# Patient Record
Sex: MALE | Race: WHITE | Employment: FULL TIME | URBAN - METROPOLITAN AREA
[De-identification: names, ages, dates, MRNs, and addresses within clinical notes are randomized per-mention and may not be internally consistent; named-entity substitution may affect disease eponyms.]

---

## 2018-10-30 RX ORDER — CITALOPRAM 20 MG/1
20 TABLET, FILM COATED ORAL DAILY
COMMUNITY

## 2018-10-30 RX ORDER — ATORVASTATIN CALCIUM 40 MG/1
40 TABLET, FILM COATED ORAL DAILY
COMMUNITY

## 2018-10-30 NOTE — PERIOP NOTES
PAT phone call completed, instructed pt to stop any supplements/Motrin/Aleve/Advil 1 week prior to surgery. OK to take meds listed with sip of water DOS. Stressed to pt necessity of having  DOS and to be home with him x 24 hours post op, verbalized understanding, questions/concerns denied.

## 2018-12-04 ENCOUNTER — ANESTHESIA EVENT (OUTPATIENT)
Dept: SURGERY | Age: 53
End: 2018-12-04
Payer: COMMERCIAL

## 2018-12-05 ENCOUNTER — ANESTHESIA (OUTPATIENT)
Dept: SURGERY | Age: 53
End: 2018-12-05
Payer: COMMERCIAL

## 2018-12-05 ENCOUNTER — APPOINTMENT (OUTPATIENT)
Dept: GENERAL RADIOLOGY | Age: 53
End: 2018-12-05
Attending: ORTHOPAEDIC SURGERY
Payer: COMMERCIAL

## 2018-12-05 ENCOUNTER — HOSPITAL ENCOUNTER (OUTPATIENT)
Age: 53
Setting detail: OUTPATIENT SURGERY
Discharge: HOME | End: 2018-12-05
Attending: ORTHOPAEDIC SURGERY | Admitting: ORTHOPAEDIC SURGERY
Payer: COMMERCIAL

## 2018-12-05 VITALS
HEART RATE: 73 BPM | RESPIRATION RATE: 16 BRPM | DIASTOLIC BLOOD PRESSURE: 95 MMHG | WEIGHT: 248.9 LBS | OXYGEN SATURATION: 96 % | TEMPERATURE: 97.5 F | BODY MASS INDEX: 30.31 KG/M2 | SYSTOLIC BLOOD PRESSURE: 131 MMHG | HEIGHT: 76 IN

## 2018-12-05 DIAGNOSIS — M19.032 ARTHRITIS OF WRIST, LEFT: Primary | ICD-10-CM

## 2018-12-05 PROCEDURE — 77030002916 HC SUT ETHLN J&J -A: Performed by: ORTHOPAEDIC SURGERY

## 2018-12-05 PROCEDURE — 64415 NJX AA&/STRD BRCH PLXS IMG: CPT

## 2018-12-05 PROCEDURE — 76210000050 HC AMBSU PH II REC 0.5 TO 1 HR: Performed by: ORTHOPAEDIC SURGERY

## 2018-12-05 PROCEDURE — C1713 ANCHOR/SCREW BN/BN,TIS/BN: HCPCS | Performed by: ORTHOPAEDIC SURGERY

## 2018-12-05 PROCEDURE — 77030006689 HC BLD OPHTH BVR BD -A: Performed by: ORTHOPAEDIC SURGERY

## 2018-12-05 PROCEDURE — 74011250636 HC RX REV CODE- 250/636: Performed by: ORTHOPAEDIC SURGERY

## 2018-12-05 PROCEDURE — 77030020782 HC GWN BAIR PAWS FLX 3M -B

## 2018-12-05 PROCEDURE — 76210000034 HC AMBSU PH I REC 0.5 TO 1 HR: Performed by: ORTHOPAEDIC SURGERY

## 2018-12-05 PROCEDURE — 74011250636 HC RX REV CODE- 250/636: Performed by: ANESTHESIOLOGY

## 2018-12-05 PROCEDURE — 74011000272 HC RX REV CODE- 272: Performed by: ORTHOPAEDIC SURGERY

## 2018-12-05 PROCEDURE — 74011250636 HC RX REV CODE- 250/636

## 2018-12-05 PROCEDURE — 77030002996 HC SUT SLK J&J -A: Performed by: ORTHOPAEDIC SURGERY

## 2018-12-05 PROCEDURE — A4565 SLINGS: HCPCS

## 2018-12-05 PROCEDURE — 77030011992 HC AIRWY NASOPHGL TELE -A: Performed by: ANESTHESIOLOGY

## 2018-12-05 PROCEDURE — 76000 FLUOROSCOPY <1 HR PHYS/QHP: CPT

## 2018-12-05 PROCEDURE — 77030018836 HC SOL IRR NACL ICUM -A: Performed by: ORTHOPAEDIC SURGERY

## 2018-12-05 PROCEDURE — 76030000003 HC AMB SURG OR TIME 1.5 TO 2: Performed by: ORTHOPAEDIC SURGERY

## 2018-12-05 PROCEDURE — 74011000250 HC RX REV CODE- 250: Performed by: ORTHOPAEDIC SURGERY

## 2018-12-05 PROCEDURE — 77030000032 HC CUF TRNQT ZIMM -B: Performed by: ORTHOPAEDIC SURGERY

## 2018-12-05 PROCEDURE — 76060000063 HC AMB SURG ANES 1.5 TO 2 HR: Performed by: ORTHOPAEDIC SURGERY

## 2018-12-05 PROCEDURE — L4398 FOOT DROP SPLINT PRE OTS: HCPCS

## 2018-12-05 PROCEDURE — 77030003601 HC NDL NRV BLK BBMI -A

## 2018-12-05 DEVICE — IMPLANTABLE DEVICE: Type: IMPLANTABLE DEVICE | Site: WRIST | Status: FUNCTIONAL

## 2018-12-05 RX ORDER — SODIUM CHLORIDE 0.9 % (FLUSH) 0.9 %
5-10 SYRINGE (ML) INJECTION AS NEEDED
Status: DISCONTINUED | OUTPATIENT
Start: 2018-12-05 | End: 2018-12-05 | Stop reason: HOSPADM

## 2018-12-05 RX ORDER — HYDROMORPHONE HYDROCHLORIDE 1 MG/ML
.25-1 INJECTION, SOLUTION INTRAMUSCULAR; INTRAVENOUS; SUBCUTANEOUS
Status: DISCONTINUED | OUTPATIENT
Start: 2018-12-05 | End: 2018-12-05 | Stop reason: HOSPADM

## 2018-12-05 RX ORDER — ROPIVACAINE HYDROCHLORIDE 5 MG/ML
INJECTION, SOLUTION EPIDURAL; INFILTRATION; PERINEURAL AS NEEDED
Status: DISCONTINUED | OUTPATIENT
Start: 2018-12-05 | End: 2018-12-05 | Stop reason: HOSPADM

## 2018-12-05 RX ORDER — LIDOCAINE HYDROCHLORIDE 10 MG/ML
0.1 INJECTION, SOLUTION EPIDURAL; INFILTRATION; INTRACAUDAL; PERINEURAL AS NEEDED
Status: DISCONTINUED | OUTPATIENT
Start: 2018-12-05 | End: 2018-12-05 | Stop reason: HOSPADM

## 2018-12-05 RX ORDER — PROPOFOL 10 MG/ML
INJECTION, EMULSION INTRAVENOUS AS NEEDED
Status: DISCONTINUED | OUTPATIENT
Start: 2018-12-05 | End: 2018-12-05 | Stop reason: HOSPADM

## 2018-12-05 RX ORDER — PROPOFOL 10 MG/ML
INJECTION, EMULSION INTRAVENOUS
Status: DISCONTINUED | OUTPATIENT
Start: 2018-12-05 | End: 2018-12-05 | Stop reason: HOSPADM

## 2018-12-05 RX ORDER — SODIUM CHLORIDE, SODIUM LACTATE, POTASSIUM CHLORIDE, CALCIUM CHLORIDE 600; 310; 30; 20 MG/100ML; MG/100ML; MG/100ML; MG/100ML
100 INJECTION, SOLUTION INTRAVENOUS CONTINUOUS
Status: DISCONTINUED | OUTPATIENT
Start: 2018-12-05 | End: 2018-12-05 | Stop reason: HOSPADM

## 2018-12-05 RX ORDER — OXYCODONE AND ACETAMINOPHEN 5; 325 MG/1; MG/1
1 TABLET ORAL
Qty: 30 TAB | Refills: 0 | Status: SHIPPED
Start: 2018-12-05 | End: 2022-02-14

## 2018-12-05 RX ORDER — FENTANYL CITRATE 50 UG/ML
INJECTION, SOLUTION INTRAMUSCULAR; INTRAVENOUS AS NEEDED
Status: DISCONTINUED | OUTPATIENT
Start: 2018-12-05 | End: 2018-12-05 | Stop reason: HOSPADM

## 2018-12-05 RX ORDER — PROMETHAZINE HYDROCHLORIDE 25 MG/1
25 TABLET ORAL
Qty: 20 TAB | Refills: 0 | Status: SHIPPED
Start: 2018-12-05 | End: 2022-02-14

## 2018-12-05 RX ORDER — SODIUM CHLORIDE 0.9 % (FLUSH) 0.9 %
5-10 SYRINGE (ML) INJECTION EVERY 8 HOURS
Status: DISCONTINUED | OUTPATIENT
Start: 2018-12-05 | End: 2018-12-05 | Stop reason: HOSPADM

## 2018-12-05 RX ORDER — CEPHALEXIN 500 MG/1
500 CAPSULE ORAL 2 TIMES DAILY
Qty: 14 CAP | Refills: 0 | Status: SHIPPED
Start: 2018-12-05 | End: 2022-02-14

## 2018-12-05 RX ORDER — DEXAMETHASONE SODIUM PHOSPHATE 4 MG/ML
INJECTION, SOLUTION INTRA-ARTICULAR; INTRALESIONAL; INTRAMUSCULAR; INTRAVENOUS; SOFT TISSUE AS NEEDED
Status: DISCONTINUED | OUTPATIENT
Start: 2018-12-05 | End: 2018-12-05 | Stop reason: HOSPADM

## 2018-12-05 RX ORDER — MIDAZOLAM HYDROCHLORIDE 1 MG/ML
INJECTION, SOLUTION INTRAMUSCULAR; INTRAVENOUS AS NEEDED
Status: DISCONTINUED | OUTPATIENT
Start: 2018-12-05 | End: 2018-12-05 | Stop reason: HOSPADM

## 2018-12-05 RX ORDER — CEFAZOLIN SODIUM/WATER 2 G/20 ML
2 SYRINGE (ML) INTRAVENOUS ONCE
Status: COMPLETED | OUTPATIENT
Start: 2018-12-05 | End: 2018-12-05

## 2018-12-05 RX ORDER — BACITRACIN ZINC 500 UNIT/G
OINTMENT (GRAM) TOPICAL AS NEEDED
Status: DISCONTINUED | OUTPATIENT
Start: 2018-12-05 | End: 2018-12-05 | Stop reason: HOSPADM

## 2018-12-05 RX ORDER — LIDOCAINE HYDROCHLORIDE 20 MG/ML
INJECTION, SOLUTION INFILTRATION; PERINEURAL AS NEEDED
Status: DISCONTINUED | OUTPATIENT
Start: 2018-12-05 | End: 2018-12-05 | Stop reason: HOSPADM

## 2018-12-05 RX ADMIN — FENTANYL CITRATE 50 MCG: 50 INJECTION, SOLUTION INTRAMUSCULAR; INTRAVENOUS at 08:02

## 2018-12-05 RX ADMIN — MIDAZOLAM HYDROCHLORIDE 3 MG: 1 INJECTION, SOLUTION INTRAMUSCULAR; INTRAVENOUS at 10:06

## 2018-12-05 RX ADMIN — MIDAZOLAM HYDROCHLORIDE 2 MG: 1 INJECTION, SOLUTION INTRAMUSCULAR; INTRAVENOUS at 10:11

## 2018-12-05 RX ADMIN — MIDAZOLAM HYDROCHLORIDE 1 MG: 1 INJECTION, SOLUTION INTRAMUSCULAR; INTRAVENOUS at 08:00

## 2018-12-05 RX ADMIN — Medication 2 G: at 10:06

## 2018-12-05 RX ADMIN — FENTANYL CITRATE 50 MCG: 50 INJECTION, SOLUTION INTRAMUSCULAR; INTRAVENOUS at 07:59

## 2018-12-05 RX ADMIN — FENTANYL CITRATE 25 MCG: 50 INJECTION, SOLUTION INTRAMUSCULAR; INTRAVENOUS at 10:31

## 2018-12-05 RX ADMIN — FENTANYL CITRATE 25 MCG: 50 INJECTION, SOLUTION INTRAMUSCULAR; INTRAVENOUS at 10:42

## 2018-12-05 RX ADMIN — FENTANYL CITRATE 25 MCG: 50 INJECTION, SOLUTION INTRAMUSCULAR; INTRAVENOUS at 10:49

## 2018-12-05 RX ADMIN — PROPOFOL 30 MG: 10 INJECTION, EMULSION INTRAVENOUS at 10:15

## 2018-12-05 RX ADMIN — ROPIVACAINE HYDROCHLORIDE 30 ML: 5 INJECTION, SOLUTION EPIDURAL; INFILTRATION; PERINEURAL at 08:07

## 2018-12-05 RX ADMIN — LIDOCAINE HYDROCHLORIDE 100 MG: 20 INJECTION, SOLUTION INFILTRATION; PERINEURAL at 10:11

## 2018-12-05 RX ADMIN — FENTANYL CITRATE 25 MCG: 50 INJECTION, SOLUTION INTRAMUSCULAR; INTRAVENOUS at 10:36

## 2018-12-05 RX ADMIN — SODIUM CHLORIDE, SODIUM LACTATE, POTASSIUM CHLORIDE, AND CALCIUM CHLORIDE 100 ML/HR: 600; 310; 30; 20 INJECTION, SOLUTION INTRAVENOUS at 07:57

## 2018-12-05 RX ADMIN — DEXAMETHASONE SODIUM PHOSPHATE 4 MG: 4 INJECTION, SOLUTION INTRA-ARTICULAR; INTRALESIONAL; INTRAMUSCULAR; INTRAVENOUS; SOFT TISSUE at 08:07

## 2018-12-05 RX ADMIN — MIDAZOLAM HYDROCHLORIDE 3 MG: 1 INJECTION, SOLUTION INTRAMUSCULAR; INTRAVENOUS at 07:59

## 2018-12-05 RX ADMIN — PROPOFOL 75 MCG/KG/MIN: 10 INJECTION, EMULSION INTRAVENOUS at 10:15

## 2018-12-05 RX ADMIN — MIDAZOLAM HYDROCHLORIDE 1 MG: 1 INJECTION, SOLUTION INTRAMUSCULAR; INTRAVENOUS at 08:01

## 2018-12-05 NOTE — ANESTHESIA PROCEDURE NOTES
Peripheral Block Start time: 12/5/2018 7:58 AM 
End time: 12/5/2018 8:07 AM 
Performed by: Livier Castle MD 
Authorized by: Livier Castle MD  
 
 
Pre-procedure: Indications: at surgeon's request and primary anesthetic Preanesthetic Checklist: patient identified, risks and benefits discussed, site marked, timeout performed, anesthesia consent given and patient being monitored Timeout Time: 07:58 Block Type:  
Block Type:  Supraclavicular Laterality:  Left Monitoring:  Continuous pulse ox, frequent vital sign checks, heart rate, responsive to questions and oxygen Injection Technique:  Single shot Procedures: ultrasound guided and nerve stimulator Patient Position: supine Prep: chlorhexidine Location:  Supraclavicular Needle Type:  Stimuplex Needle Gauge:  22 G Needle Localization:  Anatomical landmarks and ultrasound guidance Assessment: 
Number of attempts:  1 Injection Assessment:  Incremental injection every 5 mL, local visualized surrounding nerve on ultrasound, negative aspiration for blood, no paresthesia and no intravascular symptoms Patient tolerance:  Patient tolerated the procedure well with no immediate complications

## 2018-12-05 NOTE — BRIEF OP NOTE
BRIEF OPERATIVE NOTE    Date of Procedure: 12/5/2018   Preoperative Diagnosis: LEFT WRIST OSTEOARTHRITIS  Postoperative Diagnosis: LEFT WRIST OSTEOARTHRITIS    Procedure(s):  LEFT WRIST SCAPHOTRAPEZIOTRAPEZOIDAL ARTHROPLASTY WITH EXCISION OF SCAPHOID DISTAL 3RD, GRAFT INTERPOSITION AND LUNATE CYST CURETTAGE, BONE GRAFTING (LONG ACTING REGIONAL BLOCK)  Surgeon(s) and Role:     Gus Magana MD - Primary         Surgical Assistant: none    Surgical Staff:  Circ-1: Jesika Agudelo RN  Scrub Tech-1: Rito TOM  Surg Asst-1: Amber Aponte  No case tracking events are documented in the log.   Anesthesia: Regional   Estimated Blood Loss: minimal  Specimens: * No specimens in log *   Findings: severe arthritis   Complications: none  Implants: * No implants in log *

## 2018-12-05 NOTE — PROGRESS NOTES
POST ANESTHESIA CARE    DISCHARGE / TRANSFER NOTE    Jadyn Mane was   discharged     via     wheel chair     to   private vehicle    . Patient was escorted by nurse    . Patient verbalized   appreciation and was very pleased with care received    throughout their stay. Patient was discharged in     pleasant mood    . Pain at discharge/transfer was    0 /10. Discharge, medication and follow-up instructions were verbalized as understood prior to discharge  (if applicable for same-day procedures being discharged.)    All personal belongings have been returned to patient, and patient/family verbally confirm receiving belongings as all present.     Signed: Rashard SOWN RN-BC

## 2018-12-05 NOTE — DISCHARGE INSTRUCTIONS
MD Dr. Naldo Banks Dr., MD Dr. Minette Abate. Severo Angus, MD    You have undergone surgery by one of our hand specialists. Please follow these instructions to ensure a safe and speedy recovery. 1. SURGICAL DRESSING (bandage): Your bandage should be kept dry and in place until you return to the office for your follow up visit. This helps to guard against infection. [x]         You can shower if you place a plastic bag over your dressing.    []         You will need to sponge bathe until you are seen in the office. 2. ELEVATION:  It is VERY IMPORTANT that you keep your arm and hand above the level of your heart at all times, awake or asleep. The higher you elevate your hand, the less it will swell, and the less it will hurt. It is best to elevate the hand as shown below:              Laying down, especially at night,  with your arm elevated on pillows help keep your shoulder and elbow from getting stiff. 3. Ice bags / ice packs can be used to help control pain. If you make your own ice bag, double-bagging helps to prevent leaks. 4. MEDICATIONS:  You have been given a prescription for pain medications. Take it according to the instructions on the bottle. DO NOT drink alcohol while taking pain medications. DO NOT drive, operate heavy machinery, or make important personal or business decisions since the medications will make you drowsy. We do NOT refill prescriptions over the weekend. Please arrange to call for prescription refills during regular office hours. 5. APPOINTMENT:  Your post operative appointment has been made for the following time:    TIME:1:20pm             DATE:12/18/18         At the:   []  [facility] Office       [x]  [address] [place] [address] Office with Dr Rocael Gan    6. AFTER GENERAL ANESTHESIA or IV SEDATION:   DO NOT drink alcohol or drive, work around [place] [address], or make important personal or business decisions. Limit your activity for 24 hours. Resume your diet with light foods (Jell-o ®, clear soups, clear fluids, caffeine-free drinks). If you do not have any nausea, you may resume your regular diet. We at [address] [place] [address] want your surgery and recovery to be as comfortable and successful as possible. Should you have problems, please call our office during the morning hours. In particular, call if your pain is not adequately controlled by prescribed medication, temperature is over 101 degrees, or your bandage is wet or has a four odor. Your doctor contact information:   [address] [place] [address] [place] [phone]  Kaia Orozcopiter, ext [phone] [facility] END OFFICE - [address], [address]  [place], [address] from Your Nurse    PATIENT INSTRUCTIONS:    AFTER ANESTHESIA & SEDATION, and WHILE TAKING PAIN MEDICINE  After general anesthesia / intravenous sedation and the 24 hours following, and/or while taking prescription Opiates:  · Limit your activities  · Do not drive and operate hazardous machinery until you have been of all narcotics and sedatives for over 24 hours  · Do not make important personal or business decisions  · Do  not drink alcoholic beverages  · If you have not urinated within 8 hours after discharge, please contact your surgeon on call. SIGNS OF INFECTION  Report the following Signs of Infection or General Problems after surgery to your surgeon:  · Excessive pain, swelling, redness or odor of or around the surgical area  · Temperature over 101; Temperature over 100 if on medications that affect your ability to fight infections  · Nausea and vomiting lasting longer than 4 hours or if unable to take medications  · Any signs of decreased circulation or nerve impairment to extremity: change in color, persistent  numbness, tingling, coldness or increase pain  · If you have any questions.       COUGH AND DEEP BREATHE  Breathing deep and coughing are very important exercises to do after surgery. Deep breathing and coughing open the little air tubes and air sacks in your lungs. You take deep breaths every day. You may not even notice - it is just something you do when you sigh or yawn. It is a natural exercise you do to keep these air passages open. After surgery, take deep breaths and cough, on purpose. Coughing and deep breathing help prevent bronchitis and pneumonia after surgery. If you had chest or belly surgery, use a pillow as a \"hug joaquim\" and hold it tightly to your chest or belly when you cough. DIRECTIONS:  1. Take 10 to 15 slow deep breaths every hour while awake. 2. Breathe in deeply, and hold it for 2 seconds. 3. Exhale slowly through puckered lips, like blowing up a balloon. 4. After every 4th or 5th deep breath, hug your pillow to your chest or belly and give a hard, deep cough. Yes, it will probably hurt. But doing this exercise is very important part of healing after surgery. Take your pain medicine to help you do this exercise without too much pain. ANKLE PUMPS    Ankle pumps increase the circulation of oxygenated blood to your lower extremities and decrease your risk for circulation problems such as blood clots. They also stretch the muscles, tendons and ligaments in your foot and ankle, and prevent joint contracture in the ankle and foot, especially after surgeries on the legs. It is important to do ankle pump exercises regularly after surgery because immobility increases your risk for developing a blood clot. Your doctor may also have you take an Aspirin for the next few days as well. If your doctor did not ask you to take an Aspirin, consult with him before starting Aspirin therapy on your own. The exercise is quite simple.      · Slowly point your foot forward, feeling the muscles on the top of your lower leg stretch, and hold this position for 5 seconds. · Next, pull your foot back toward you as far as possible, stretching the calf muscles, and hold that position for 5 seconds. · Repeat with the other foot. · Perform 10 repetitions every hour while awake for both ankles if possible (down and then up with the foot once is one repetition). You should feel gentle stretching of the muscles in your lower leg when doing this exercise. If you feel pain, or your range of motion is limited, don't push too hard. Only go the limit your joint and muscles will let you go. If you have increasing pain, progressively worsening leg warmth or swelling, STOP the exercise and call your doctor. Other Wound Care information:  [] No additional recommendations. P.R.I.C.E. INSTRUCTIONS    PRICE is an acronym that stands for Protect, Rest, Ice, Compression, and Elevation (sometimes you might see the acronym RICE.)   Listed below are five activities one can do for an injured limb or soft tissue injury. While much anecdotal understanding learned through many years of experience supports these seemingly common sense treatments, building scientific evidence is showing how and why these treatment principles are proving to be so beneficial.  Below is a breakdown of what the PRICE principles entail to speed healing along. PROTECT may sound like an obvious thing to do, and really, it is common sense. After an injury or surgery, protecting the site that hurts help to prevent further injury. REST is essential for an injured limb. Like protection, the more you are up on an injured limb, especially in the early stages of an injury, the more damage you can do. Rest means no activities that would involve the use of the injured tissues so that the early stages of healing can begin without  interruption. ICE \"is perhaps the simplest and oldest [therapy] in the treatment of soft tissue injuries. \"4    Ice help decrease swelling in inflamed and damaged tissues, can diminish the feeling of pain and decrease muscle spasms, and, immediately after an injury,  can slow cellular metabolism and help to prevent further tissue injury from oxygen starvation caused by the swelling. 5      COMPRESSION help decrease pain by limiting movement of an injured limb. Compression can be found through the use of an elastic wrap bandage, a cast, splint, or simply a snug cooling cuff or an ice pack and pillow. ELEVATION is a very important intervention. Placing the injury above the level of the heart whenever possible helps decrease swelling by using gravity to one's advantage . Placing the injury above the level of the heart also helps prevent, or at least decrease, the throbbing pain that is sometimes experienced after surgery or injury. Sources:  6. Muscle injuries: optimizing recovery (2007) Best Practice & Research Clinical Rheumatology Vol. 21, No. 2, pp 223-553, Accessed 9/26/11    7. PRICE first aid guidelines - Protection, Rest, Ice, Compression and Elevation By Ronita Lines, About. com Guide, Updated March 27, 2011, Accessed 9/26/11 http://sportsmedicine. [employer]/cs/rehab/a/rice. htm    8. Rest Ice Compression Elevation: RICE for injuries, Accessed 9/26/11 LipLotion.ch. com/rest-ice-compression-elevation. html    9. The use of ice in the treatment of acute soft-tissue injury (2004) Jhonny, Vol. 32, No. 1, pp 251-261, Accessed 9/26/11 http://ajs.Qwilt.Iper[employer]/content/32/1/251.full.pdf+html    10. Soft tissue damage and healing; theory and techniques, www.iaaf.org, Ch. 9 of  medical page, by tessa Wei And María Elena Wallace 9/27/11 FormerIdols.gl. pdf              Going Home After A  Peripheral Nerve Block    Patient Information    The anesthesiology team has provided for your pain control through a technique called regional anesthesia.   As the name implies, anesthesia (decreased or no pain, sensation, or motor control) has been provided to a specific region, whether that be an arm or a leg. How does this happen?  you might ask. While you were sleepy, the anesthesia provider provided medicine to a specific group of nerves either in the neck/shoulder region or in the groin and/or buttock region, similar to the way a dentist might numb a tooth (teeth.)  They typically use an ultrasound machine to know where the medicine is placed in relation to the nerves they wish to numb up or block.   What this means is that for the next few hours, you should expect to have a numb limb. Below are some things we wish for you to read and be familiar with concerning your anesthetized limb. Caring For a Blocked Body Part    General Considerations:   The numbness may last up to 24 hours   You must protect your arm or leg. The blocked extremity is numb, weak, and difficult for your brain to locate and communicate with. To do this you should:  o Pay attention to the position of the blocked limb at all times. o Be very careful when placing hot or cod items on a numb limb. You could cause tissue damage like burns or frostbite if you are unable to feel temperature. Carefully follow your discharge instructions regarding the use of these therapies in you post-operative care. o Carefully pad the affected limb. Normally we continually move and adjust the position of our bodies without thinking about it. This movement and continuous repositioning helps to prevent injuries from immobility. When a limb is numb it still requires this care  o Be extremely careful not to bump or hit the numb body part. This can result in an unrecognized injury, at lease until the blocked limb wakes up. It can also result in worse pain of your already post-surgical limb.     Arm/Shoulder Blocks:   You may experience a droopy eyelid, nasal stuffiness, and redness of the eye after receiving an arm/shoulder block. This is called Bhargavis Syndrome, and is very common. There is no need for concern. You may also experience mild hoarseness, but all of these symptoms should resolve within 24 hours.  Some patients may experience mild shortness of breath. A sitting position will help alleviate this and it should resolve within 24 hours. If you experience significant or progressive worsening of the shortness of breath, seek medical attention immediately. Knee/Foot Blocks:   DO NOT use the blocked leg to walk, balance or support yourself. Your leg will not be able to balance your weight properly while any part of your leg is numb. You are at a RISK for [address].  Be careful not to drag your foot along the ground or stub your toes. Contact Phone Numbers    CALL 911 IN CASE OF AN EMERGENCY. For all other non-emergency inquiries call the [facility]  at [phone] and ask for the anesthesiologist on call to be paged. Below is information on the medication(s) your doctor is prescribing for you: The maximum daily dose of acetaminophen was discussed with the patient. He was encouraged not to exceed 3,000 mg of acetaminophen during a 24 hour period and was asked to keep in mind that acetaminophen can also be found in many over-the-counter cold medications as well as narcotics that may be given for pain. The patient expresses understanding of these issues and questions were answered. 4 THINGS ABOUT PAIN MEDICINE I ALWAYS TALK ABOUT:  There are 4 side effects I always talk about for pain medications. 1. They make you sleepy and drowsy. Do not drive a car or operate machines while taking pain medication. Do not make any major decisions. Take a nap. Relax. Let your body recover from the affects of anesthesia and surgery. 2. Some people have quite a problem with itching and. ..  3. Nausea or vomiting.   I mention these together because research tends to suggest there is a gene-related issue. While some have a hard time with these problems, others do not. These are expected and know side effects. Over-the-counter Benadryl® (the drug name is diphenhydramine) can help with the itching. Your doctor may also have given you some medicine for nausea. IF HE DID NOT, CALL HIM/HER. 4. Last but not least is the problem of constipation (not meena able to have a bowel movement - poop.)  All pain medicine can slow down the movement of food through the gut. The slower it goes, the worse it can be. Frankly, this means hard poop adding insult to the injury of surgery. And if you had tummy surgery, like having your gall bladder removed or a hernia repair, YOU DO NOT WANT THIS PROBLEM. There are 4 things I recommend. · Drink lots of fluids. For healthy people with no heart problems, this means at least 64 ounces of liquids or more per day. For example, a Big Gulp® from [employer] is 32 ounces. So you need to drink at least 2  Big Gulp®'s of fluids every day. If you have heart problems you may not be able to do this. Talk to your doctor about what you should do to prevent constipation. · Drinking fruit juice like apple, pear, or prune juice gives you extra \"BANG\" for your beverage. These drinks are high in natural fiber. If you are a diabetic, drink sugar-free fluids with fiber additives (see next 2 points.)  Avoid drinking extra fruit juice unless this is a regular part of your diet plan. · Eat extra fresh fruits and vegetables. · Add extra fiber-products. Fiber products like Metamucil®, Citrucel®, Miralax® or Benefiber® can help. These products are over-the-counter and you do not need a prescription from your doctor. If you have followed these recommendations and still have some difficulty having a good poop, take and over-the-counter stimulant like Dulcolax® (biscodyl)  or Senokot® (senna concentrate).   These may help get things moving. Jomar Choi MEDICATION AND   SIDE EFFECT GUIDE    The [facility] MEDICATION AND SIDE EFFECT GUIDE was provided to the PATIENT AND CARE PROVIDER. Information provided includes instruction about drug purpose and common side effects for the following medications:   · OXCYCODONE/ACETAMINOPHEN - for Pain  · Chela Flakes - antibiotic  · PHENERGAN - if needed for nausea      Due to patients Obstructive Sleep Apnea and current use of a CPAP machine, continuous use of CPAP machine while taking pain medications is advised and instructed. Medication information added to discharge record on December 5, 2018 at 11:21 AM.      Some information we wish all of our patients to be familiar with and General Information for Healthy Lifestyle choices:    · Make a list of your current medications with your Primary Care Provider. · Update this list whenever your medications are discontinued, doses are changed, or new medications (including over-the-counter products like ibuprofen, vitamins, or herbal remedies) are added. · Carry medication information at all times in case of emergency situations      No smoking / No tobacco products / Avoid exposure to second hand smoke    Surgeon General's Warning:  Quitting smoking now greatly reduces serious risk to your health. Obesity, smoking, and sedentary lifestyle greatly increases your risk for illness. A healthy diet, regular physical exercise & weight monitoring are important for maintaining a healthy lifestyle. A Note About Congestive Heart Failure: You may be retaining fluid if you have a history of heart failure or if you experience any of the following symptoms:      · Weight gain of 3 pounds or more overnight or 5 pounds in a week  · Increased swelling in our hands or feet  · Shortness of breath while lying flat in bed      Please call your doctor as soon as you notice any of these symptoms; do not wait until your next office visit.     A Note About Strokes:  Recognize signs and symptoms of STROKE. The simple mnemonic, F.A.S.T., can help you remember signs of a stroke and what to do if you suspect a stroke is occuring to you or someone you are with:    F - Face looks uneven  A - Arms unable to move, or move evenly  S - Speech is slurred or non-existent  T - Time - CALL 911 as soon as signs and symptoms begin - DO NOT go to bed or wait to see if you get better - TIME IS BRAIN. Warning Signs of HEART ATTACK   Call 911 if you have these symptoms:     Chest discomfort. Most heart attacks involve discomfort in the center of the chest that lasts more than a few minutes, or that goes away and comes back. It can feel like uncomfortable pressure, squeezing, fullness, or pain.  Discomfort in other areas of the upper body. Symptoms can include pain or discomfort in one or both arms, the back, neck, jaw, or stomach.  Shortness of breath with or without chest discomfort.  Other signs may include breaking out in a cold sweat, nausea, or lightheadedness. Don't wait more than five minutes to call 911 - MINUTES MATTER! Fast action can save your life. Calling 911 is almost always the fastest way to get lifesaving treatment. Emergency Medical Services staff can begin treatment when they arrive -- up to an hour sooner than if someone gets to the hospital by car. AT THE COMPLETION OF DISCHARGE INSTRUCTION REVIEW, WE VERIFY:  The discharge information has been reviewed with the patient and caregiver. Questions have been asked and answered meeting patient and caregiver expectations. The patient and caregiver verbalized understanding.     Your discharge nurse was Kristin FRANZ, RN-BC       Board Certified - Pain Management      Other information found in your discharge envelope:  [x]     PRESCRIPTIONS     [] Sent electronically to your pharmacy  []     PHYSICAL THERAPY PRESCRIPTION  []     APPOINTMENT CARDS  [x]     Regional Anesthesia Pamphlet for block or block with On-Q Catheter from Anesthesia  Service  []     Medical device information sheets/pamphlets from their    []     School/work excuse note. []     /parent work excuse note. The following personal items collected during your admission for safe keeping are returned to you:     Dental Appliance: Dental Appliances: None  Vi shaquille: Visual Aid: None  Hearing Aid:    Jewelry: Jewelry: None  Clothing: Clothing: (street clothes to preop [facility])  Other Valuables:  Other Valuables: None  Valuables sent to safe:

## 2018-12-05 NOTE — ANESTHESIA POSTPROCEDURE EVALUATION
Procedure(s): LEFT WRIST SCAPHOTRAPEZIOTRAPEZOIDAL ARTHROPLASTY WITH EXCISION OF SCAPHOID DISTAL 3RD, GRAFT INTERPOSITION AND LUNATE CYST CURETTAGE, BONE GRAFTING (LONG ACTING REGIONAL BLOCK). Anesthesia Post Evaluation Patient location during evaluation: bedside Level of consciousness: awake Pain management: satisfactory to patient Airway patency: patent Anesthetic complications: no 
Cardiovascular status: acceptable Respiratory status: acceptable Hydration status: acceptable Visit Vitals /51 Pulse 73 Temp 36.4 °C (97.5 °F) Resp 21 Ht 6' 4\" (1.93 m) Wt 112.9 kg (248 lb 14.4 oz) SpO2 96% BMI 30.30 kg/m²

## 2018-12-05 NOTE — ANESTHESIA PREPROCEDURE EVALUATION
Anesthetic History No history of anesthetic complications Review of Systems / Medical History Patient summary reviewed, nursing notes reviewed and pertinent labs reviewed Pulmonary Within defined limits Sleep apnea Neuro/Psych Within defined limits Psychiatric history Cardiovascular Within defined limits GI/Hepatic/Renal 
Within defined limits Endo/Other Within defined limits Other Findings Physical Exam 
 
Airway Mallampati: II 
TM Distance: 4 - 6 cm Neck ROM: normal range of motion Mouth opening: Normal 
 
 Cardiovascular Rhythm: regular Rate: normal 
 
 
 
 Dental 
No notable dental hx Pulmonary Breath sounds clear to auscultation Abdominal 
 
 
 
 Other Findings Anesthetic Plan ASA: 2 Anesthesia type: regional - supraclavicular block Anesthetic plan and risks discussed with: Patient

## 2018-12-05 NOTE — PERIOP NOTES
PACU IN REPORT FROM ANESTHESIA    Verbal report received from   [address]   following Regional for Procedure(s) (LRB):  LEFT WRIST SCAPHOTRAPEZIOTRAPEZOIDAL ARTHROPLASTY WITH EXCISION OF SCAPHOID DISTAL 3RD, GRAFT INTERPOSITION AND LUNATE CYST CURETTAGE, BONE GRAFTING (LONG ACTING REGIONAL BLOCK) (Left) by  Tico Mata MD.    Note the anesthesia record for medications given intraoperatively. Brief Initial Visual Assessment:    Patient Age: [] Infant(1-12mo)      []Pediatric(1-13yrs)    [] Adolescent(13-18yrs)    [x] Adult(18-65yrs)      []Geriatric Adult(>65yrs). Patient    [] Alert           []Calm & Cooperative      [] Anxious  Appearance: [] Drowsy      [x] Sedated                          [x] Unresponsive     Oriented x 0           Airway:     [] Patent                          [x] Near-obstructed   [] Obstructed                        [x]Improved with head/airway repositioning                       Airway Adjuncts Present: [] Oral Airway    [] Nasal Trumpet         [] ETT     Respiratory  [x] Even              [] Labored      [] Shallow  Pattern:    [x] Non-Labored  [] VENT and/or respiratory assistance     being provided. Skin:     [x] Pink [] Pale       [x] Warm [] Cool [] Cold   [x]Dry [] Moist [] Diaphoretic     Membranes:  [x] Pink [] Pale       [x] Moist [] Dry [] Crusty     Pain:   [x] No Acute Discomfort. 0 /10 Scale [] Verbal Numeric   [] Moderate Discomfort.      [] V.A.S. [] Acute Discomfort. [x] A.N.V.    [] Chronic-Issue Related Discomfort.   [] F.L.A.C.C. Note E-MAR for medications administered. []Faces, Gonzalez/Baker    Note assessments documented in flowsheets; any assessment variants to be found in comments or narrative perioperative nurse notes.        Post-anesthesia care now assumed by [facility] BSN, RN-BC

## 2022-02-14 ENCOUNTER — HOSPITAL ENCOUNTER (EMERGENCY)
Age: 57
Discharge: HOME | End: 2022-02-14
Attending: EMERGENCY MEDICINE
Payer: COMMERCIAL

## 2022-02-14 ENCOUNTER — APPOINTMENT (OUTPATIENT)
Dept: GENERAL RADIOLOGY | Age: 57
End: 2022-02-14
Attending: EMERGENCY MEDICINE
Payer: COMMERCIAL

## 2022-02-14 ENCOUNTER — APPOINTMENT (OUTPATIENT)
Dept: CT IMAGING | Age: 57
End: 2022-02-14
Attending: EMERGENCY MEDICINE
Payer: COMMERCIAL

## 2022-02-14 VITALS
TEMPERATURE: 97.7 F | HEIGHT: 76 IN | RESPIRATION RATE: 21 BRPM | SYSTOLIC BLOOD PRESSURE: 132 MMHG | OXYGEN SATURATION: 95 % | HEART RATE: 63 BPM | BODY MASS INDEX: 31.3 KG/M2 | WEIGHT: 257.06 LBS | DIASTOLIC BLOOD PRESSURE: 87 MMHG

## 2022-02-14 DIAGNOSIS — R07.9 CHEST PAIN, UNSPECIFIED TYPE: Primary | ICD-10-CM

## 2022-02-14 LAB
ALBUMIN SERPL-MCNC: 4.1 G/DL (ref 3.5–5)
ALBUMIN/GLOB SERPL: 1.2 {RATIO} (ref 1.1–2.2)
ALP SERPL-CCNC: 71 U/L (ref 45–117)
ALT SERPL-CCNC: 31 U/L (ref 12–78)
ANION GAP SERPL CALC-SCNC: 12 MMOL/L (ref 5–15)
AST SERPL-CCNC: 41 U/L (ref 15–37)
ATRIAL RATE: 70 BPM
BASOPHILS # BLD: 0.1 K/UL (ref 0–0.1)
BASOPHILS NFR BLD: 1 % (ref 0–1)
BILIRUB SERPL-MCNC: 1.3 MG/DL (ref 0.2–1)
BNP SERPL-MCNC: 18 PG/ML (ref 0–125)
BUN SERPL-MCNC: 16 MG/DL (ref 6–20)
BUN/CREAT SERPL: 17 (ref 12–20)
CALCIUM SERPL-MCNC: 9.2 MG/DL (ref 8.5–10.1)
CALCULATED P AXIS, ECG09: 47 DEGREES
CALCULATED R AXIS, ECG10: 45 DEGREES
CALCULATED T AXIS, ECG11: 40 DEGREES
CHLORIDE SERPL-SCNC: 102 MMOL/L (ref 97–108)
CO2 SERPL-SCNC: 23 MMOL/L (ref 21–32)
CREAT SERPL-MCNC: 0.92 MG/DL (ref 0.7–1.3)
DIAGNOSIS, 93000: NORMAL
DIFFERENTIAL METHOD BLD: NORMAL
EOSINOPHIL # BLD: 0.2 K/UL (ref 0–0.4)
EOSINOPHIL NFR BLD: 3 % (ref 0–7)
ERYTHROCYTE [DISTWIDTH] IN BLOOD BY AUTOMATED COUNT: 12.3 % (ref 11.5–14.5)
GLOBULIN SER CALC-MCNC: 3.3 G/DL (ref 2–4)
GLUCOSE SERPL-MCNC: 127 MG/DL (ref 65–100)
HCT VFR BLD AUTO: 43.3 % (ref 36.6–50.3)
HGB BLD-MCNC: 15 G/DL (ref 12.1–17)
IMM GRANULOCYTES # BLD AUTO: 0 K/UL (ref 0–0.04)
IMM GRANULOCYTES NFR BLD AUTO: 0 % (ref 0–0.5)
LIPASE SERPL-CCNC: 66 U/L (ref 73–393)
LYMPHOCYTES # BLD: 1 K/UL (ref 0.8–3.5)
LYMPHOCYTES NFR BLD: 14 % (ref 12–49)
MCH RBC QN AUTO: 31.6 PG (ref 26–34)
MCHC RBC AUTO-ENTMCNC: 34.6 G/DL (ref 30–36.5)
MCV RBC AUTO: 91.2 FL (ref 80–99)
MONOCYTES # BLD: 0.7 K/UL (ref 0–1)
MONOCYTES NFR BLD: 10 % (ref 5–13)
NEUTS SEG # BLD: 5.1 K/UL (ref 1.8–8)
NEUTS SEG NFR BLD: 72 % (ref 32–75)
NRBC # BLD: 0 K/UL (ref 0–0.01)
NRBC BLD-RTO: 0 PER 100 WBC
P-R INTERVAL, ECG05: 202 MS
PLATELET # BLD AUTO: 166 K/UL (ref 150–400)
POTASSIUM SERPL-SCNC: 4.3 MMOL/L (ref 3.5–5.1)
PROT SERPL-MCNC: 7.4 G/DL (ref 6.4–8.2)
Q-T INTERVAL, ECG07: 394 MS
QRS DURATION, ECG06: 90 MS
QTC CALCULATION (BEZET), ECG08: 425 MS
RBC # BLD AUTO: 4.75 M/UL (ref 4.1–5.7)
RBC MORPH BLD: NORMAL
SODIUM SERPL-SCNC: 137 MMOL/L (ref 136–145)
TROPONIN-HIGH SENSITIVITY: 7 NG/L (ref 0–76)
TROPONIN-HIGH SENSITIVITY: 7 NG/L (ref 0–76)
VENTRICULAR RATE, ECG03: 70 BPM
WBC # BLD AUTO: 7.1 K/UL (ref 4.1–11.1)

## 2022-02-14 PROCEDURE — 80053 COMPREHEN METABOLIC PANEL: CPT

## 2022-02-14 PROCEDURE — 85025 COMPLETE CBC W/AUTO DIFF WBC: CPT

## 2022-02-14 PROCEDURE — 84484 ASSAY OF TROPONIN QUANT: CPT

## 2022-02-14 PROCEDURE — 83690 ASSAY OF LIPASE: CPT

## 2022-02-14 PROCEDURE — 74011250636 HC RX REV CODE- 250/636: Performed by: EMERGENCY MEDICINE

## 2022-02-14 PROCEDURE — 74011000636 HC RX REV CODE- 636: Performed by: EMERGENCY MEDICINE

## 2022-02-14 PROCEDURE — 93005 ELECTROCARDIOGRAM TRACING: CPT

## 2022-02-14 PROCEDURE — 83880 ASSAY OF NATRIURETIC PEPTIDE: CPT

## 2022-02-14 PROCEDURE — 99285 EMERGENCY DEPT VISIT HI MDM: CPT

## 2022-02-14 PROCEDURE — 71275 CT ANGIOGRAPHY CHEST: CPT

## 2022-02-14 PROCEDURE — 96374 THER/PROPH/DIAG INJ IV PUSH: CPT

## 2022-02-14 PROCEDURE — 36415 COLL VENOUS BLD VENIPUNCTURE: CPT

## 2022-02-14 RX ORDER — MORPHINE SULFATE 4 MG/ML
4 INJECTION INTRAVENOUS
Status: COMPLETED | OUTPATIENT
Start: 2022-02-14 | End: 2022-02-14

## 2022-02-14 RX ADMIN — IOPAMIDOL 100 ML: 755 INJECTION, SOLUTION INTRAVENOUS at 15:27

## 2022-02-14 RX ADMIN — MORPHINE SULFATE 4 MG: 4 INJECTION INTRAVENOUS at 14:14

## 2022-02-14 NOTE — DISCHARGE INSTRUCTIONS
Please call your primary care physician to schedule follow-up appointment as soon as possible. Return to the emergency department if your symptoms are worsening, if you are very short of breath, become very sweaty, passout, or develop any other symptoms you find concerning.

## 2022-02-14 NOTE — ED PROVIDER NOTES
HPI   53-year-old man with a past medical history of hyperlipidemia presents to the emergency department due to chest pressure. His symptoms started this morning after he got to work. Patient also states that the pain radiates in between his shoulder blades. It has been associated with shortness of breath on exertion. He denies any diaphoresis, nausea, or vomiting. He says this is never happened to him in the past.  He does not smoke. He has a family history of coronary artery disease in his grandmother. He denies cough. He denies any trauma. Past Medical History:   Diagnosis Date    Depression     High cholesterol     SOFÍA on CPAP        Past Surgical History:   Procedure Laterality Date    HX CARPAL TUNNEL RELEASE Left 2012    HX KNEE ARTHROSCOPY Left 2018    HX KNEE ARTHROSCOPY Right 1997    HX LAP CHOLECYSTECTOMY  2014    HX OTHER SURGICAL Right 2013    \"fusion in my right hand\"         History reviewed. No pertinent family history. Social History     Socioeconomic History    Marital status:      Spouse name: Not on file    Number of children: Not on file    Years of education: Not on file    Highest education level: Not on file   Occupational History    Not on file   Tobacco Use    Smoking status: Never Smoker    Smokeless tobacco: Never Used   Substance and Sexual Activity    Alcohol use: No    Drug use: No    Sexual activity: Not on file   Other Topics Concern    Not on file   Social History Narrative    Not on file     Social Determinants of Health     Financial Resource Strain:     Difficulty of Paying Living Expenses: Not on file   Food Insecurity:     Worried About Running Out of Food in the Last Year: Not on file    Fauzia of Food in the Last Year: Not on file   Transportation Needs:     Lack of Transportation (Medical): Not on file    Lack of Transportation (Non-Medical):  Not on file   Physical Activity:     Days of Exercise per Week: Not on file    Minutes of Exercise per Session: Not on file   Stress:     Feeling of Stress : Not on file   Social Connections:     Frequency of Communication with Friends and Family: Not on file    Frequency of Social Gatherings with Friends and Family: Not on file    Attends [employer] Services: Not on file    Active Member of Clubs or Organizations: Not on file    Attends Club or Organization Meetings: Not on file    Marital Status: Not on file   Intimate Partner Violence:     Fear of Current or Ex-Partner: Not on file    Emotionally Abused: Not on file    Physically Abused: Not on file    Sexually Abused: Not on file   Housing Stability:     Unable to Pay for Housing in the Last Year: Not on file    Number of [place] in the Last Year: Not on file    Unstable Housing in the Last Year: Not on file         ALLERGIES: Patient has no known allergies. Review of Systems   A complete review of systems was performed and all systems reviewed were negative unless otherwise documented in the HPI. Vitals:    02/14/22 1352 02/14/22 1402   BP: (!) 144/104 (!) 128/93   Pulse: 73 65   Resp: 18 21   Temp: 97.7 °F (36.5 °C)    SpO2: 95% 94%   Weight: 116.6 kg (257 lb 0.9 oz)    Height: 6' 4\" (1.93 m)             Physical Exam  Constitutional:       Comments: Patient sitting up straight in the holding bed rails but does not appear acutely distressed or diaphoretic   Eyes:      Comments: No scleral icterus. Extraocular movements intact. Neck:      Vascular: No JVD. Comments: Trachea midline  Cardiovascular:      Comments: Regular rate and rhythm. No murmurs or rubs. 2+ radial pulses bilaterally  Pulmonary:      Comments: Lungs are clear. No respiratory distress noted. No wheezing or rales  Abdominal:      Comments: Protuberant abdomen, but soft and nontender. Normal bowel sounds   Musculoskeletal:         General: Normal range of motion. Right lower leg: No edema. Left lower leg: No edema.    Skin: General: Skin is warm and dry. Neurological:      Comments: Awake and alert. Speech is normal.  GCS 15. MDM   61-year-old man who presents with the above chief complaint. Vitals are stable. Patient appears somewhat apprehensive and is sitting straight up and holding the bed rails. He is not diaphoretic. Otherwise he does not appear acutely distressed. His examination is essentially unremarkable. EKG shows normal sinus rhythm with a rate of 70 and QTC of 425 with no concerning ST elevations or depressions. No arrhythmias noted. In addition to a cardiac work-up, CT of the chest will be ordered to rule out aortic dissection. Initial labs are reassuring. High-sensitivity troponin was 7 so repeat will be ordered. CT of the chest shows no evidence of aortic dissection or any other acute abnormalities. Great vessels were also evaluated per radiology's read and there was no evidence of any significant abnormalities like a PE. On reassessment patient symptoms had significantly improved after morphine and he was only experiencing some pain when he moved his back and trunk. Repeat troponin remained stable at 7. Given his reassuring work-up patient deemed safe for discharge. Encourage the patient return to the emergency department if his symptoms recur. Patient discharged in stable condition.     Procedures

## 2022-02-14 NOTE — ED TRIAGE NOTES
Triage: pt c/o sternal chest pain radiating straight through to his back, reproducible with movement. Chest pain started this AM when he woke up. Denies injury or fall. +nausea, dizziness and SOB.

## 2023-05-23 RX ORDER — ATORVASTATIN CALCIUM 40 MG/1
40 TABLET, FILM COATED ORAL DAILY
COMMUNITY

## 2023-05-23 RX ORDER — CITALOPRAM 20 MG/1
20 TABLET ORAL DAILY
COMMUNITY

## (undated) DEVICE — DRESSING GZ FLUF 36X36 IN RND 2 PLY PD GZ AMER WHT CROSS

## (undated) DEVICE — SUTURE ETHLN SZ 4-0 L18IN NONABSORBABLE BLK L19MM PS-2 3/8 1667H

## (undated) DEVICE — HOOK LOCK LATEX FREE ELASTIC BANDAGE 3INX5YD

## (undated) DEVICE — BLADE OPHTH 180DEG CUT SURF BLU STR SHRP DBL BVL GRINDLESS

## (undated) DEVICE — SUT SLK 3-0 30IN SH BLK --

## (undated) DEVICE — MASTISOL ADHESIVE LIQ 2/3ML

## (undated) DEVICE — HAND I-LF: Brand: MEDLINE INDUSTRIES, INC.

## (undated) DEVICE — PADDING CST CRMPD 3INX4YD NS --

## (undated) DEVICE — INFECTION CONTROL KIT SYS

## (undated) DEVICE — 1010 S-DRAPE TOWEL DRAPE 10/BX: Brand: STERI-DRAPE™

## (undated) DEVICE — SUPER SPONGES,MEDIUM: Brand: DERMACEA

## (undated) DEVICE — DEVON™ KNEE AND BODY STRAP 60" X 3" (1.5 M X 7.6 CM): Brand: DEVON

## (undated) DEVICE — KERLIX BANDAGE ROLL: Brand: KERLIX

## (undated) DEVICE — LOOP VES MAXI RED SIL DISP

## (undated) DEVICE — LIGHT HANDLE: Brand: DEVON

## (undated) DEVICE — SKIN PREP TRAY W/CHG: Brand: MEDLINE INDUSTRIES, INC.

## (undated) DEVICE — STERILE POLYISOPRENE POWDER-FREE SURGICAL GLOVES: Brand: PROTEXIS

## (undated) DEVICE — BIPOLAR FORCEPS CORD: Brand: VALLEYLAB

## (undated) DEVICE — SOL IRRIGATION INJ NACL 0.9% 500ML BTL

## (undated) DEVICE — ZIMMER® STERILE DISPOSABLE TOURNIQUET CUFF WITH PROTECTIVE SLEEVE AND PLC, DUAL PORT, SINGLE BLADDER, 18 IN. (46 CM)

## (undated) DEVICE — DRAPE C ARM W54XL84IN MINI FOR OEC 6800

## (undated) DEVICE — 3M™ STERI-STRIP™ REINFORCED ADHESIVE SKIN CLOSURES, R1542, 1/4 IN X 1-1/2 IN (6 MM X 38 MM), 6 STRIPS/ENVELOPE: Brand: 3M™ STERI-STRIP™